# Patient Record
Sex: MALE | Race: WHITE | Employment: FULL TIME | URBAN - NONMETROPOLITAN AREA
[De-identification: names, ages, dates, MRNs, and addresses within clinical notes are randomized per-mention and may not be internally consistent; named-entity substitution may affect disease eponyms.]

---

## 2017-10-12 ENCOUNTER — HOSPITAL ENCOUNTER (EMERGENCY)
Age: 27
Discharge: HOME | End: 2017-10-12
Attending: NURSE PRACTITIONER
Payer: COMMERCIAL

## 2017-10-12 VITALS
OXYGEN SATURATION: 96 % | HEART RATE: 106 BPM | TEMPERATURE: 98.8 F | HEIGHT: 71 IN | BODY MASS INDEX: 37.8 KG/M2 | WEIGHT: 270 LBS | RESPIRATION RATE: 16 BRPM | SYSTOLIC BLOOD PRESSURE: 141 MMHG | DIASTOLIC BLOOD PRESSURE: 75 MMHG

## 2017-10-12 DIAGNOSIS — J01.20 ACUTE ETHMOIDAL SINUSITIS, RECURRENCE NOT SPECIFIED: Primary | ICD-10-CM

## 2017-10-12 PROCEDURE — 99212 OFFICE O/P EST SF 10 MIN: CPT

## 2017-10-12 PROCEDURE — 99213 OFFICE O/P EST LOW 20 MIN: CPT | Performed by: NURSE PRACTITIONER

## 2017-10-12 RX ORDER — AMOXICILLIN AND CLAVULANATE POTASSIUM 500; 125 MG/1; MG/1
1 TABLET, FILM COATED ORAL 3 TIMES DAILY
Qty: 30 TABLET | Refills: 0 | Status: SHIPPED | OUTPATIENT
Start: 2017-10-12 | End: 2017-10-22

## 2017-10-12 ASSESSMENT — ENCOUNTER SYMPTOMS
SORE THROAT: 1
DIARRHEA: 0
SINUS PRESSURE: 1
SINUS PAIN: 1
NAUSEA: 0
VOMITING: 0
COUGH: 1

## 2017-10-12 ASSESSMENT — PAIN SCALES - GENERAL: PAINLEVEL_OUTOF10: 8

## 2017-10-12 ASSESSMENT — PAIN DESCRIPTION - FREQUENCY: FREQUENCY: CONTINUOUS

## 2017-10-12 ASSESSMENT — PAIN DESCRIPTION - DURATION: DURATION_2: CONTINUOUS

## 2017-10-12 ASSESSMENT — PAIN DESCRIPTION - DESCRIPTORS
DESCRIPTORS_2: ACHING
DESCRIPTORS: ACHING

## 2017-10-12 ASSESSMENT — PAIN DESCRIPTION - ORIENTATION: ORIENTATION: RIGHT;LEFT

## 2017-10-12 ASSESSMENT — PAIN DESCRIPTION - LOCATION
LOCATION: EAR
LOCATION_2: THROAT

## 2017-10-12 ASSESSMENT — PAIN DESCRIPTION - PAIN TYPE
TYPE: ACUTE PAIN
TYPE_2: ACUTE PAIN

## 2017-10-12 ASSESSMENT — PAIN DESCRIPTION - PROGRESSION
CLINICAL_PROGRESSION_2: GRADUALLY WORSENING
CLINICAL_PROGRESSION: GRADUALLY WORSENING

## 2017-10-12 ASSESSMENT — PAIN DESCRIPTION - INTENSITY: RATING_2: 10

## 2017-10-12 NOTE — ED NOTES
PT GIVEN DISCHARGE INSTRUCTIONS, VERBALIZES UNDERSTANDING. PT ASSESSMENT UNCHANGED, DISCHARGED IN STABLE CONDITION.         Brandin Alcantara RN  10/12/17 4336

## 2017-10-12 NOTE — ED PROVIDER NOTES
atraumatic. Right Ear: External ear normal. A middle ear effusion is present. Left Ear: External ear normal. A middle ear effusion is present. Nose: Mucosal edema (thick green mucus) present. Right sinus exhibits no maxillary sinus tenderness and no frontal sinus tenderness. Left sinus exhibits no maxillary sinus tenderness and no frontal sinus tenderness. Mouth/Throat: Uvula is midline. Posterior oropharyngeal edema and posterior oropharyngeal erythema present. No oropharyngeal exudate. Eyes: Conjunctivae are normal.   Neck: Neck supple. Cardiovascular: Normal rate, regular rhythm and normal heart sounds. Exam reveals no gallop and no friction rub. No murmur heard. Pulmonary/Chest: Effort normal and breath sounds normal. No respiratory distress. He has no wheezes. Lymphadenopathy:     He has cervical adenopathy (with tenderness). Neurological: He is alert and oriented to person, place, and time. Skin: Skin is warm and dry. Psychiatric: He has a normal mood and affect. Nursing note and vitals reviewed. DIAGNOSTIC RESULTS   Labs:No results found for this visit on 10/12/17. IMAGING:    URGENT CARE COURSE:     Vitals:    10/12/17 0820   BP: (!) 141/75   Pulse: 106   Resp: 16   Temp: 98.8 °F (37.1 °C)   TempSrc: Oral   SpO2: 96%   Weight: 270 lb (122.5 kg)   Height: 5' 11\" (1.803 m)       Medications - No data to display  PROCEDURES:  None  FINAL IMPRESSION      1. Acute ethmoidal sinusitis, recurrence not specified        DISPOSITION/PLAN   DISPOSITION   Symptoms are consistent with acute ethmoid sinusitis. I explained to the patient he needs to continue his decongestant to stop all allergy medications due to the drying effect. I'm going to send him a prescription for Augmentin 500 mg 3 times a day for 10 days. He will be given an off work slip for [employer]. Follow up with primary care of symptoms aren't improving.   PATIENT REFERRED TO:  [facility]  [facility] will assist you in finding family doctor [phone]        DISCHARGE MEDICATIONS:  New Prescriptions    AMOXICILLIN-CLAVULANATE (AUGMENTIN) 500-125 MG PER TABLET    Take 1 tablet by mouth 3 times daily for 10 days     Current Discharge Medication List          Kenji Armendariz, [facility] [address], CNP  10/12/17 7272

## 2017-10-12 NOTE — ED TRIAGE NOTES
Pt ambulated to room with wife at side, toelrated well. Pt stated for 4 days he has had a sore throat, nasal congestion, and both ear aching. He didn't think he was having a fever at all. Pt stated he is taking Advil allergy and cold and mucinex and nothing is helping. He stated it is hard to swallow.

## 2019-09-29 ENCOUNTER — HOSPITAL ENCOUNTER (EMERGENCY)
Age: 29
Discharge: HOME | End: 2019-09-29
Attending: FAMILY MEDICINE
Payer: COMMERCIAL

## 2019-09-29 VITALS
SYSTOLIC BLOOD PRESSURE: 140 MMHG | TEMPERATURE: 98.7 F | OXYGEN SATURATION: 96 % | WEIGHT: 270 LBS | BODY MASS INDEX: 36.57 KG/M2 | RESPIRATION RATE: 18 BRPM | DIASTOLIC BLOOD PRESSURE: 91 MMHG | HEIGHT: 72 IN | HEART RATE: 86 BPM

## 2019-09-29 DIAGNOSIS — J02.0 STREP PHARYNGITIS: Primary | ICD-10-CM

## 2019-09-29 LAB
FLU A ANTIGEN: NEGATIVE
FLU B ANTIGEN: NEGATIVE
GROUP A STREP CULTURE, REFLEX: POSITIVE
REFLEX THROAT C + S: ABNORMAL

## 2019-09-29 PROCEDURE — 87804 INFLUENZA ASSAY W/OPTIC: CPT

## 2019-09-29 PROCEDURE — 6370000000 HC RX 637 (ALT 250 FOR IP): Performed by: FAMILY MEDICINE

## 2019-09-29 PROCEDURE — 99283 EMERGENCY DEPT VISIT LOW MDM: CPT

## 2019-09-29 PROCEDURE — 87880 STREP A ASSAY W/OPTIC: CPT

## 2019-09-29 RX ORDER — AMOXICILLIN 250 MG/1
500 CAPSULE ORAL ONCE
Status: COMPLETED | OUTPATIENT
Start: 2019-09-29 | End: 2019-09-29

## 2019-09-29 RX ORDER — AMOXICILLIN 500 MG/1
500 CAPSULE ORAL 3 TIMES DAILY
Qty: 30 CAPSULE | Refills: 0 | Status: SHIPPED | OUTPATIENT
Start: 2019-09-29 | End: 2019-10-09

## 2019-09-29 RX ADMIN — AMOXICILLIN 500 MG: 250 CAPSULE ORAL at 19:00

## 2019-09-29 ASSESSMENT — ENCOUNTER SYMPTOMS
NAUSEA: 0
SINUS PRESSURE: 0
EYE DISCHARGE: 0
SORE THROAT: 1
SINUS PAIN: 0
VOMITING: 0
EYE REDNESS: 0
COUGH: 1

## 2025-01-16 ENCOUNTER — HOSPITAL ENCOUNTER (EMERGENCY)
Age: 35
Discharge: HOME | End: 2025-01-17
Attending: FAMILY MEDICINE
Payer: COMMERCIAL

## 2025-01-16 DIAGNOSIS — K80.50 BILIARY COLIC: Primary | ICD-10-CM

## 2025-01-16 DIAGNOSIS — R10.11 ABDOMINAL PAIN, RIGHT UPPER QUADRANT: ICD-10-CM

## 2025-01-16 PROCEDURE — 99284 EMERGENCY DEPT VISIT MOD MDM: CPT

## 2025-01-16 ASSESSMENT — PAIN SCALES - GENERAL: PAINLEVEL_OUTOF10: 8

## 2025-01-16 ASSESSMENT — PAIN - FUNCTIONAL ASSESSMENT: PAIN_FUNCTIONAL_ASSESSMENT: 0-10

## 2025-01-17 VITALS
WEIGHT: 270 LBS | HEART RATE: 79 BPM | TEMPERATURE: 97.7 F | DIASTOLIC BLOOD PRESSURE: 79 MMHG | HEIGHT: 71 IN | RESPIRATION RATE: 19 BRPM | SYSTOLIC BLOOD PRESSURE: 125 MMHG | OXYGEN SATURATION: 100 % | BODY MASS INDEX: 37.8 KG/M2

## 2025-01-17 LAB
ALBUMIN SERPL BCP-MCNC: 4.1 GM/DL (ref 3.4–5)
ALP SERPL-CCNC: 67 U/L (ref 46–116)
ALT SERPL W P-5'-P-CCNC: 47 U/L (ref 14–63)
ANION GAP SERPL CALC-SCNC: 11 MEQ/L (ref 8–16)
AST SERPL W P-5'-P-CCNC: 23 U/L (ref 15–37)
BASOPHILS # BLD: 0.3 % (ref 0–3)
BASOPHILS ABSOLUTE: 0 THOU/MM3 (ref 0–0.1)
BILIRUB DIRECT SERPL-MCNC: 0.08 MG/DL (ref 0–0.2)
BILIRUB SERPL-MCNC: 0.2 MG/DL (ref 0.2–1)
BUN SERPL-MCNC: 18 MG/DL (ref 7–18)
CALCIUM SERPL-MCNC: 9.1 MG/DL (ref 8.5–10.1)
CHLORIDE SERPL-SCNC: 103 MEQ/L (ref 98–107)
CO2 SERPL-SCNC: 28 MEQ/L (ref 21–32)
CREAT SERPL-MCNC: 1 MG/DL (ref 0.6–1.3)
EOSINOPHILS ABSOLUTE: 0.1 THOU/MM3 (ref 0–0.5)
EOSINOPHILS RELATIVE PERCENT: 1.5 % (ref 0–4)
GFR SERPL CREATININE-BSD FRML MDRD: > 90 ML/MIN/1.73M2
GLUCOSE SERPL-MCNC: 136 MG/DL (ref 74–106)
HCT VFR BLD CALC: 40.6 % (ref 42–52)
HEMOGLOBIN: 13.4 GM/DL (ref 14–18)
IMMATURE GRANS (ABS): 0 THOU/MM3 (ref 0–0.07)
IMMATURE GRANULOCYTES %: 0 %
LIPASE SERPL-CCNC: 31 U/L (ref 16–77)
LYMPHOCYTES # BLD AUTO: 41.2 % (ref 15–47)
LYMPHOCYTES ABSOLUTE: 3.2 THOU/MM3 (ref 1–4.8)
MCH RBC QN AUTO: 29.4 PG (ref 26–32)
MCHC RBC AUTO-ENTMCNC: 33 GM/DL (ref 31–35)
MCV RBC AUTO: 89 FL (ref 80–94)
MONOCYTES: 0.8 THOU/MM3 (ref 0.3–1.3)
MONOCYTES: 9.8 % (ref 0–12)
NEUTROPHILS ABSOLUTE: 3.7 THOU/MM3 (ref 1.8–7.7)
PDW BLD-RTO: 12.6 % (ref 11.5–14.9)
PLATELET # BLD AUTO: 221 THOU/MM3 (ref 130–400)
PMV BLD AUTO: 10.5 FL (ref 9.4–12.4)
POTASSIUM SERPL-SCNC: 3.4 MEQ/L (ref 3.5–5.1)
PROT SERPL-MCNC: 7.5 GM/DL (ref 6.4–8.2)
RBC # BLD: 4.56 MILL/MM3 (ref 4.5–6.1)
SEG NEUTROPHILS: 47.1 % (ref 43–75)
SODIUM SERPL-SCNC: 142 MEQ/L (ref 136–145)
TROPONIN, HIGH SENSITIVITY: 6 PG/ML (ref 0–76.1)
WBC # BLD: 7.8 THOU/MM3 (ref 4.8–10.8)

## 2025-01-17 PROCEDURE — 2500000003 HC RX 250 WO HCPCS: Performed by: FAMILY MEDICINE

## 2025-01-17 PROCEDURE — 85025 COMPLETE CBC W/AUTO DIFF WBC: CPT

## 2025-01-17 PROCEDURE — 80048 BASIC METABOLIC PNL TOTAL CA: CPT

## 2025-01-17 PROCEDURE — 6360000002 HC RX W HCPCS: Performed by: FAMILY MEDICINE

## 2025-01-17 PROCEDURE — 80076 HEPATIC FUNCTION PANEL: CPT

## 2025-01-17 PROCEDURE — 96375 TX/PRO/DX INJ NEW DRUG ADDON: CPT

## 2025-01-17 PROCEDURE — 96374 THER/PROPH/DIAG INJ IV PUSH: CPT

## 2025-01-17 PROCEDURE — 83690 ASSAY OF LIPASE: CPT

## 2025-01-17 PROCEDURE — 2580000003 HC RX 258: Performed by: FAMILY MEDICINE

## 2025-01-17 PROCEDURE — 84484 ASSAY OF TROPONIN QUANT: CPT

## 2025-01-17 RX ORDER — KETOROLAC TROMETHAMINE 30 MG/ML
30 INJECTION, SOLUTION INTRAMUSCULAR; INTRAVENOUS ONCE
Status: COMPLETED | OUTPATIENT
Start: 2025-01-17 | End: 2025-01-17

## 2025-01-17 RX ADMIN — KETOROLAC TROMETHAMINE 30 MG: 30 INJECTION, SOLUTION INTRAMUSCULAR at 00:22

## 2025-01-17 RX ADMIN — FAMOTIDINE 20 MG: 10 INJECTION, SOLUTION INTRAVENOUS at 00:22

## 2025-01-17 NOTE — ED NOTES
Patient reported, \"abdominal pain wraps around the abdominal more on the right side to the back. Last ate two cheeseburgers at 1730. Abdominal pain started around 1900. I took pepto bismal around 1930 and it didn't help.\" Observed patient appears to be uncomfortable, placed in a gown. IV started, labs drawn and taken to the lab. Dr. Narayan informed of the patient.

## 2025-01-17 NOTE — DISCHARGE INSTR - COC
Continuity of Care Form    Patient Name: Pawel Marshall   :  1990  MRN:  [id number]    Admit date:  2025  Discharge date:  ***    Code Status Order: No Order   Advance Directives:   Advance Care Flowsheet Documentation             Admitting Physician:  No admitting provider for patient encounter.  PCP: No primary care provider on file.    Discharging Nurse: ***  Discharging Hospital Unit/Room#: E5/E5  Discharging Unit Phone Number: ***    Emergency Contact:   Extended Emergency Contact Information  Primary Emergency Contact: Fariba Marshall  Address: [address] [place] [id number] [facility]  Home Phone: [phone]  Relation: Spouse    Past Surgical History:  Past Surgical History:   Procedure Laterality Date    ANKLE SURGERY      left ankle        Immunization History:     There is no immunization history on file for this patient.    Active Problems:  There is no problem list on file for this patient.      Isolation/Infection:   Isolation            No Isolation          Patient Infection Status       None to display            Nurse Assessment:  Last Vital Signs: /79   Pulse 79   Temp 97.7 °F (36.5 °C) (Oral)   Resp 19   Ht 1.803 m (5' 11\")   Wt 122.5 kg (270 lb)   SpO2 100%   BMI 37.66 kg/m²     Last documented pain score (0-10 scale): Pain Level: 8  Last Weight:   Wt Readings from Last 1 Encounters:   25 122.5 kg (270 lb)     Mental Status:  {IP PT MENTAL STATUS:05513}    IV Access:  { HARRY IV ACCESS:141457321}    Nursing Mobility/ADLs:  Walking   {CHP DME ADLs:331433539}  Transfer  {CHP DME ADLs:699805601}  Bathing  {CHP DME ADLs:032479142}  Dressing  {CHP DME ADLs:390426693}  Toileting  {CHP DME ADLs:205595537}  Feeding  {CHP DME ADLs:510101714}  Med Admin  {CHP DME ADLs:433887725}  Med Delivery   { HARRY MED Delivery:007074902}    Wound Care Documentation and Therapy:        Elimination:  Continence:   Bowel: {YES / NO:13298}  Bladder: {YES

## 2025-01-17 NOTE — ED PROVIDER NOTES
Gallbladder transverse: Positive echogenic stones with acoustic shadows. GB wall 0.2 cm thick.      Common bile duct: Not dilated, 0.2 cm diameter.   Final impression:   Limited gallbladder US, positive for cholelithiasis, negative for cholecystitis or CBD dilatation.    Images and videos stored on PACS System     Vitals:    01/16/25 2355   BP: (!) 134/104   Pulse: 72   Resp: 19   Temp: 97.7 °F (36.5 °C)   SpO2: 98%       Differential diagnosis: Biliary colic, pancreatitis, non-specific abdominal pain, UTI    ED COURSE & MEDICAL DECISION MAKING   Pleasant 34M presented with RUQ pain, found to be due to gallstones, without cholecystitis. Pt was given IV toradol and pepcid, which seemed to have helped to relieve his symptoms. Via shared decision making, pt agreed with plan for discharge home, given dietary restrictions and recommendations.    FINAL IMPRESSION   1. Biliary colic    2. Abdominal pain, right upper quadrant      FINAL DISPOSITION:  Discharge home with anticipatory guidance, given strict return precautions.      Pertinent Labs & Imaging studies reviewed and interpreted. (See chart for details)   See EMR for medications prescribed    (This note was completed with a voice recognition program)       Owen Narayan MD  01/17/25 0102

## 2025-01-31 ENCOUNTER — PREP FOR PROCEDURE (OUTPATIENT)
Dept: SURGERY | Age: 35
End: 2025-01-31

## 2025-01-31 ENCOUNTER — OFFICE VISIT (OUTPATIENT)
Dept: SURGERY | Age: 35
End: 2025-01-31
Payer: COMMERCIAL

## 2025-01-31 ENCOUNTER — TELEPHONE (OUTPATIENT)
Dept: SURGERY | Age: 35
End: 2025-01-31

## 2025-01-31 VITALS
HEART RATE: 68 BPM | BODY MASS INDEX: 37.1 KG/M2 | SYSTOLIC BLOOD PRESSURE: 106 MMHG | OXYGEN SATURATION: 96 % | TEMPERATURE: 98.2 F | HEIGHT: 71 IN | WEIGHT: 265 LBS | DIASTOLIC BLOOD PRESSURE: 62 MMHG

## 2025-01-31 DIAGNOSIS — R10.13 EPIGASTRIC ABDOMINAL PAIN: Primary | ICD-10-CM

## 2025-01-31 DIAGNOSIS — K80.10 CHRONIC CHOLECYSTITIS WITH CALCULUS: ICD-10-CM

## 2025-01-31 PROCEDURE — 99204 OFFICE O/P NEW MOD 45 MIN: CPT | Performed by: SURGERY

## 2025-01-31 NOTE — PROGRESS NOTES
ARTEM Yang  [facility] GENERAL SURGERY  [address]  [place] [address]  [phone]  New Patient Evaluation in Office    Pt Name: Pawel Marshall  Date of Birth 1990   Today's Date: 1/31/2025  Medical Record Number: [id number]  Referring Provider: No ref. provider found  Primary Care Provider: No primary care provider on file.  Chief Complaint   Patient presents with    Surgical Consult     NP seen in ER 1/17/25 - Epigastric abdominal pain       ASSESSMENT       Diagnosis Orders   1. Epigastric abdominal pain  US GALLBLADDER RUQ    bedside US in ED suggesting cholelithiasis.      History reviewed. No pertinent past medical history.       PLANS   Assessment & Plan   Schedule Pawel for L/S cholecystectomy possible open  The risks, options and alternatives were discussed at length with the patient.  The risks including bleeding, infection, reoperation, bile duct injury and possible conversion to an open procedure were covered as well as nonresolution of pain. The possibility of other unforeseen complications including bile leak were also mentioned. Patient was made aware of intraoperative complications such as other organ injury or injury to surrounding structures. We also discussed the conduct of the operation, probable outpatient stay postoperatively and the typical post operative recovery and restrictions.  The patients questions were answered. After this discussion, the patient would like to proceed with cholecystectomy.  Status: outpatient  Planned anesthesia: general  He will undergo pre-operative clearance per anesthesia guidelines with risk factors listed under the past medical history diagnosis & problem list.     SUBJECTIVE      Pawel is a 34 y.o. male seen in the consultation for evaluation regarding gallbladder. Onset of symptoms was a few months with gradually worsened since that time. The symptoms have included right upper quadrant pain and pain

## 2025-02-06 ENCOUNTER — HOSPITAL ENCOUNTER (OUTPATIENT)
Dept: ULTRASOUND IMAGING | Age: 35
Discharge: HOME | End: 2025-02-06
Attending: SURGERY
Payer: COMMERCIAL

## 2025-02-06 DIAGNOSIS — R10.13 EPIGASTRIC ABDOMINAL PAIN: ICD-10-CM

## 2025-02-06 PROCEDURE — 76705 ECHO EXAM OF ABDOMEN: CPT

## 2025-02-07 RX ORDER — INDOCYANINE GREEN AND WATER 25 MG
2.5 KIT INJECTION
Status: CANCELLED | OUTPATIENT
Start: 2025-02-07 | End: 2025-02-07

## 2025-02-07 RX ORDER — SODIUM CHLORIDE 0.9 % (FLUSH) 0.9 %
5-40 SYRINGE (ML) INJECTION EVERY 12 HOURS SCHEDULED
Status: CANCELLED | OUTPATIENT
Start: 2025-02-07

## 2025-02-07 RX ORDER — SODIUM CHLORIDE 9 MG/ML
INJECTION, SOLUTION INTRAVENOUS PRN
Status: CANCELLED | OUTPATIENT
Start: 2025-02-07

## 2025-02-07 RX ORDER — SODIUM CHLORIDE 0.9 % (FLUSH) 0.9 %
5-40 SYRINGE (ML) INJECTION PRN
Status: CANCELLED | OUTPATIENT
Start: 2025-02-07

## 2025-02-09 NOTE — H&P
ARTEM Yang  [facility] GENERAL SURGERY  [address]  [place] [address]  [phone]  New Patient Evaluation in Office    Pt Name: Pawel Marshall  Date of Birth 1990   Today's Date: 1/31/2025  Medical Record Number: [id number]  Referring Provider: No ref. provider found  Primary Care Provider: No primary care provider on file.  Chief Complaint   Patient presents with    Surgical Consult     NP seen in ER 1/17/25 - Epigastric abdominal pain       ASSESSMENT       Diagnosis Orders   1. Epigastric abdominal pain  US GALLBLADDER RUQ    bedside US in ED suggesting cholelithiasis.      History reviewed. No pertinent past medical history.       PLANS   Assessment & Plan   Schedule Pawel for L/S cholecystectomy possible open  The risks, options and alternatives were discussed at length with the patient.  The risks including bleeding, infection, reoperation, bile duct injury and possible conversion to an open procedure were covered as well as nonresolution of pain. The possibility of other unforeseen complications including bile leak were also mentioned. Patient was made aware of intraoperative complications such as other organ injury or injury to surrounding structures. We also discussed the conduct of the operation, probable outpatient stay postoperatively and the typical post operative recovery and restrictions.  The patients questions were answered. After this discussion, the patient would like to proceed with cholecystectomy.  Status: outpatient  Planned anesthesia: general  He will undergo pre-operative clearance per anesthesia guidelines with risk factors listed under the past medical history diagnosis & problem list.     SUBJECTIVE      Pawel is a 34 y.o. male seen in the consultation for evaluation regarding gallbladder. Onset of symptoms was a few months with gradually worsened since that time. The symptoms have included right upper quadrant pain and pain

## 2025-02-10 ENCOUNTER — ANESTHESIA EVENT (OUTPATIENT)
Dept: OPERATING ROOM | Age: 35
End: 2025-02-10
Payer: COMMERCIAL

## 2025-02-10 ENCOUNTER — ANESTHESIA (OUTPATIENT)
Dept: OPERATING ROOM | Age: 35
End: 2025-02-10
Payer: COMMERCIAL

## 2025-02-10 ENCOUNTER — HOSPITAL ENCOUNTER (OUTPATIENT)
Age: 35
Setting detail: OUTPATIENT SURGERY
Discharge: HOME | End: 2025-02-10
Attending: SURGERY | Admitting: SURGERY
Payer: COMMERCIAL

## 2025-02-10 VITALS
TEMPERATURE: 97.4 F | OXYGEN SATURATION: 96 % | HEIGHT: 70 IN | RESPIRATION RATE: 16 BRPM | HEART RATE: 67 BPM | SYSTOLIC BLOOD PRESSURE: 124 MMHG | DIASTOLIC BLOOD PRESSURE: 61 MMHG | WEIGHT: 264 LBS | BODY MASS INDEX: 37.8 KG/M2

## 2025-02-10 DIAGNOSIS — G89.18 ACUTE POSTOPERATIVE PAIN: ICD-10-CM

## 2025-02-10 DIAGNOSIS — K80.10 CHRONIC CHOLECYSTITIS WITH CALCULUS: Primary | ICD-10-CM

## 2025-02-10 PROCEDURE — 3700000000 HC ANESTHESIA ATTENDED CARE: Performed by: SURGERY

## 2025-02-10 PROCEDURE — 47562 LAPAROSCOPIC CHOLECYSTECTOMY: CPT | Performed by: SURGERY

## 2025-02-10 PROCEDURE — 3700000001 HC ADD 15 MINUTES (ANESTHESIA): Performed by: SURGERY

## 2025-02-10 PROCEDURE — 6360000002 HC RX W HCPCS: Performed by: NURSE ANESTHETIST, CERTIFIED REGISTERED

## 2025-02-10 PROCEDURE — 2720000010 HC SURG SUPPLY STERILE: Performed by: SURGERY

## 2025-02-10 PROCEDURE — 2580000003 HC RX 258: Performed by: SURGERY

## 2025-02-10 PROCEDURE — 6370000000 HC RX 637 (ALT 250 FOR IP): Performed by: SURGERY

## 2025-02-10 PROCEDURE — 3600000013 HC SURGERY LEVEL 3 ADDTL 15MIN: Performed by: SURGERY

## 2025-02-10 PROCEDURE — 6360000002 HC RX W HCPCS: Performed by: ANESTHESIOLOGY

## 2025-02-10 PROCEDURE — 7100000011 HC PHASE II RECOVERY - ADDTL 15 MIN: Performed by: SURGERY

## 2025-02-10 PROCEDURE — 6360000002 HC RX W HCPCS: Performed by: SURGERY

## 2025-02-10 PROCEDURE — 2500000003 HC RX 250 WO HCPCS: Performed by: NURSE ANESTHETIST, CERTIFIED REGISTERED

## 2025-02-10 PROCEDURE — 7100000001 HC PACU RECOVERY - ADDTL 15 MIN: Performed by: SURGERY

## 2025-02-10 PROCEDURE — 2709999900 HC NON-CHARGEABLE SUPPLY: Performed by: SURGERY

## 2025-02-10 PROCEDURE — 88304 TISSUE EXAM BY PATHOLOGIST: CPT

## 2025-02-10 PROCEDURE — 3600000003 HC SURGERY LEVEL 3 BASE: Performed by: SURGERY

## 2025-02-10 PROCEDURE — 7100000010 HC PHASE II RECOVERY - FIRST 15 MIN: Performed by: SURGERY

## 2025-02-10 PROCEDURE — 7100000000 HC PACU RECOVERY - FIRST 15 MIN: Performed by: SURGERY

## 2025-02-10 RX ORDER — DEXAMETHASONE SODIUM PHOSPHATE 10 MG/ML
INJECTION, EMULSION INTRAMUSCULAR; INTRAVENOUS
Status: DISCONTINUED | OUTPATIENT
Start: 2025-02-10 | End: 2025-02-10 | Stop reason: SDUPTHER

## 2025-02-10 RX ORDER — ONDANSETRON 2 MG/ML
INJECTION INTRAMUSCULAR; INTRAVENOUS
Status: DISCONTINUED | OUTPATIENT
Start: 2025-02-10 | End: 2025-02-10 | Stop reason: SDUPTHER

## 2025-02-10 RX ORDER — LIDOCAINE HCL/PF 100 MG/5ML
SYRINGE (ML) INJECTION
Status: DISCONTINUED | OUTPATIENT
Start: 2025-02-10 | End: 2025-02-10 | Stop reason: SDUPTHER

## 2025-02-10 RX ORDER — HYDROCODONE BITARTRATE AND ACETAMINOPHEN 5; 325 MG/1; MG/1
1 TABLET ORAL EVERY 4 HOURS PRN
Qty: 30 TABLET | Refills: 0 | Status: SHIPPED | OUTPATIENT
Start: 2025-02-10 | End: 2025-02-15

## 2025-02-10 RX ORDER — NALOXONE HYDROCHLORIDE 0.4 MG/ML
INJECTION, SOLUTION INTRAMUSCULAR; INTRAVENOUS; SUBCUTANEOUS PRN
Status: DISCONTINUED | OUTPATIENT
Start: 2025-02-10 | End: 2025-02-10 | Stop reason: HOSPADM

## 2025-02-10 RX ORDER — FENTANYL CITRATE 50 UG/ML
50 INJECTION, SOLUTION INTRAMUSCULAR; INTRAVENOUS EVERY 5 MIN PRN
Status: DISCONTINUED | OUTPATIENT
Start: 2025-02-10 | End: 2025-02-10 | Stop reason: HOSPADM

## 2025-02-10 RX ORDER — PHENYLEPHRINE HCL IN 0.9% NACL 1 MG/10 ML
SYRINGE (ML) INTRAVENOUS
Status: DISCONTINUED | OUTPATIENT
Start: 2025-02-10 | End: 2025-02-10 | Stop reason: SDUPTHER

## 2025-02-10 RX ORDER — ROCURONIUM BROMIDE 10 MG/ML
INJECTION, SOLUTION INTRAVENOUS
Status: DISCONTINUED | OUTPATIENT
Start: 2025-02-10 | End: 2025-02-10 | Stop reason: SDUPTHER

## 2025-02-10 RX ORDER — HYDROMORPHONE HYDROCHLORIDE 2 MG/ML
INJECTION, SOLUTION INTRAMUSCULAR; INTRAVENOUS; SUBCUTANEOUS
Status: DISCONTINUED | OUTPATIENT
Start: 2025-02-10 | End: 2025-02-10 | Stop reason: SDUPTHER

## 2025-02-10 RX ORDER — SODIUM CHLORIDE 0.9 % (FLUSH) 0.9 %
5-40 SYRINGE (ML) INJECTION PRN
Status: DISCONTINUED | OUTPATIENT
Start: 2025-02-10 | End: 2025-02-10 | Stop reason: HOSPADM

## 2025-02-10 RX ORDER — KETOROLAC TROMETHAMINE 30 MG/ML
INJECTION, SOLUTION INTRAMUSCULAR; INTRAVENOUS
Status: DISCONTINUED | OUTPATIENT
Start: 2025-02-10 | End: 2025-02-10 | Stop reason: SDUPTHER

## 2025-02-10 RX ORDER — ONDANSETRON 2 MG/ML
4 INJECTION INTRAMUSCULAR; INTRAVENOUS EVERY 6 HOURS PRN
Status: DISCONTINUED | OUTPATIENT
Start: 2025-02-10 | End: 2025-02-10 | Stop reason: HOSPADM

## 2025-02-10 RX ORDER — SODIUM CHLORIDE 0.9 % (FLUSH) 0.9 %
5-40 SYRINGE (ML) INJECTION EVERY 12 HOURS SCHEDULED
Status: DISCONTINUED | OUTPATIENT
Start: 2025-02-10 | End: 2025-02-10 | Stop reason: HOSPADM

## 2025-02-10 RX ORDER — SODIUM CHLORIDE 9 MG/ML
INJECTION, SOLUTION INTRAVENOUS PRN
Status: DISCONTINUED | OUTPATIENT
Start: 2025-02-10 | End: 2025-02-10 | Stop reason: HOSPADM

## 2025-02-10 RX ORDER — FENTANYL CITRATE 50 UG/ML
INJECTION, SOLUTION INTRAMUSCULAR; INTRAVENOUS
Status: DISCONTINUED | OUTPATIENT
Start: 2025-02-10 | End: 2025-02-10 | Stop reason: SDUPTHER

## 2025-02-10 RX ORDER — HYDROCODONE BITARTRATE AND ACETAMINOPHEN 5; 325 MG/1; MG/1
1 TABLET ORAL EVERY 4 HOURS PRN
Status: DISCONTINUED | OUTPATIENT
Start: 2025-02-10 | End: 2025-02-10 | Stop reason: HOSPADM

## 2025-02-10 RX ORDER — INDOCYANINE GREEN AND WATER 25 MG
2.5 KIT INJECTION
Status: DISCONTINUED | OUTPATIENT
Start: 2025-02-10 | End: 2025-02-10 | Stop reason: HOSPADM

## 2025-02-10 RX ORDER — PROPOFOL 10 MG/ML
INJECTION, EMULSION INTRAVENOUS
Status: DISCONTINUED | OUTPATIENT
Start: 2025-02-10 | End: 2025-02-10 | Stop reason: SDUPTHER

## 2025-02-10 RX ORDER — BUPIVACAINE HYDROCHLORIDE 5 MG/ML
INJECTION, SOLUTION EPIDURAL; INTRACAUDAL PRN
Status: DISCONTINUED | OUTPATIENT
Start: 2025-02-10 | End: 2025-02-10 | Stop reason: ALTCHOICE

## 2025-02-10 RX ADMIN — PROPOFOL 200 MG: 10 INJECTION, EMULSION INTRAVENOUS at 09:27

## 2025-02-10 RX ADMIN — FENTANYL CITRATE 25 MCG: 50 INJECTION, SOLUTION INTRAMUSCULAR; INTRAVENOUS at 09:49

## 2025-02-10 RX ADMIN — Medication 100 MCG: at 09:44

## 2025-02-10 RX ADMIN — Medication 100 MCG: at 09:45

## 2025-02-10 RX ADMIN — SUGAMMADEX 200 MG: 100 INJECTION, SOLUTION INTRAVENOUS at 10:09

## 2025-02-10 RX ADMIN — HYDROMORPHONE HYDROCHLORIDE 0.5 MG: 2 INJECTION INTRAMUSCULAR; INTRAVENOUS; SUBCUTANEOUS at 09:51

## 2025-02-10 RX ADMIN — SODIUM CHLORIDE: 9 INJECTION, SOLUTION INTRAVENOUS at 09:06

## 2025-02-10 RX ADMIN — Medication 3000 MG: at 09:38

## 2025-02-10 RX ADMIN — KETOROLAC TROMETHAMINE 30 MG: 30 INJECTION, SOLUTION INTRAMUSCULAR at 10:00

## 2025-02-10 RX ADMIN — FENTANYL CITRATE 75 MCG: 50 INJECTION, SOLUTION INTRAMUSCULAR; INTRAVENOUS at 09:27

## 2025-02-10 RX ADMIN — Medication 100 MG: at 09:27

## 2025-02-10 RX ADMIN — HYDROMORPHONE HYDROCHLORIDE 1 MG: 2 INJECTION INTRAMUSCULAR; INTRAVENOUS; SUBCUTANEOUS at 10:16

## 2025-02-10 RX ADMIN — ONDANSETRON 4 MG: 2 INJECTION, SOLUTION INTRAMUSCULAR; INTRAVENOUS at 10:00

## 2025-02-10 RX ADMIN — ROCURONIUM BROMIDE 50 MG: 10 INJECTION, SOLUTION INTRAVENOUS at 09:27

## 2025-02-10 RX ADMIN — HYDROMORPHONE HYDROCHLORIDE 0.5 MG: 2 INJECTION INTRAMUSCULAR; INTRAVENOUS; SUBCUTANEOUS at 10:20

## 2025-02-10 RX ADMIN — HYDROCODONE BITARTRATE AND ACETAMINOPHEN 1 TABLET: 5; 325 TABLET ORAL at 11:23

## 2025-02-10 RX ADMIN — FENTANYL CITRATE 50 MCG: 50 INJECTION, SOLUTION INTRAMUSCULAR; INTRAVENOUS at 10:40

## 2025-02-10 RX ADMIN — DEXAMETHASONE SODIUM PHOSPHATE 10 MG: 10 INJECTION, EMULSION INTRAMUSCULAR; INTRAVENOUS at 09:27

## 2025-02-10 ASSESSMENT — PAIN SCALES - GENERAL
PAINLEVEL_OUTOF10: 5
PAINLEVEL_OUTOF10: 5
PAINLEVEL_OUTOF10: 6
PAINLEVEL_OUTOF10: 7
PAINLEVEL_OUTOF10: 6
PAINLEVEL_OUTOF10: 5
PAINLEVEL_OUTOF10: 7

## 2025-02-10 ASSESSMENT — PAIN DESCRIPTION - DESCRIPTORS
DESCRIPTORS: ACHING
DESCRIPTORS: ACHING
DESCRIPTORS: STABBING
DESCRIPTORS: ACHING
DESCRIPTORS: ACHING

## 2025-02-10 ASSESSMENT — PAIN SCALES - WONG BAKER
WONGBAKER_NUMERICALRESPONSE: HURTS A LITTLE BIT

## 2025-02-10 ASSESSMENT — PAIN DESCRIPTION - LOCATION
LOCATION: ABDOMEN

## 2025-02-10 ASSESSMENT — PAIN DESCRIPTION - ONSET
ONSET: ON-GOING

## 2025-02-10 ASSESSMENT — PAIN DESCRIPTION - PAIN TYPE
TYPE: SURGICAL PAIN

## 2025-02-10 ASSESSMENT — PAIN DESCRIPTION - FREQUENCY: FREQUENCY: CONTINUOUS

## 2025-02-10 ASSESSMENT — PAIN - FUNCTIONAL ASSESSMENT
PAIN_FUNCTIONAL_ASSESSMENT: 0-10
PAIN_FUNCTIONAL_ASSESSMENT: PREVENTS OR INTERFERES SOME ACTIVE ACTIVITIES AND ADLS
PAIN_FUNCTIONAL_ASSESSMENT: 0-10

## 2025-02-10 ASSESSMENT — PAIN DESCRIPTION - ORIENTATION: ORIENTATION: MID

## 2025-02-10 NOTE — ANESTHESIA POSTPROCEDURE EVALUATION
src:Temporal, Pulse:80, Resp:18, SpO2:93 %    Level of Consciousness:  Awake    Respiratory:  Stable    Oxygen Saturation:  Stable    Cardiovascular:  Stable    Hydration:  Adequate    PONV:  Stable    Post-op Pain:  Adequate analgesia    Post-op Assessment:  No apparent anesthetic complications    Additional Follow-Up / Treatment / Comment:  None    Peng Preciado DO  February 10, 2025   2:14 PM      No notable events documented.

## 2025-02-10 NOTE — PROGRESS NOTES
Patient oriented to Same Day department and admitted to Same Day Surgery room 20.   Patient verbalized approval for first name, last initial with physician name on unit whiteboard.     Plan of care reviewed with patient.   Patient room whiteboard filled out and discussed with patient and responsible adult.   Patient and responsible adult offered Same Day Welcome Packet to review.    Call light in reach.   Bed in lowest position, locked, with one bed rail up.   SCDs and warming blanket in place.  Appropriate arm bands on patient.   Bathroom offered.   All questions and concerns of patient addressed.        Meds to Beds:   Patient informed of [facility] Meds to Beds program during admission. Patient is agreeable to program.   Contact information for the pharmacy and the Meds to Beds program:   Name: Fariba   Relationship to patient:spouse/significant other   Phone number: [phone]

## 2025-02-10 NOTE — PROGRESS NOTES
1017 pt arrived to pacu, awakens to voice. Respirations unlabored on 4L NC. Sites CDI x4. VSS. Pt states pain 8/10 at this time  1020 medicated by CRNA  1025 pt resting, resp easy. VSS  1040 pt awake in bed, states pain 7/10. Medicated with 50 mcg fentanyl. VSS  1045 pt resting, easily awakens to voice and states pain 6/10 and tolerable. VSS  1050 pt resting with snoring respirations. VSS  1100 pt meets criteria for discharge from pacu at this time. Pt transported to \[facility]\"" in stable condition

## 2025-02-10 NOTE — PROGRESS NOTES
Pt [facility]. Vitals and assessment as charted. 0.9 infusing, to count from PACU. Pt has crackers and water. Family at the bedside. Pt and family verbalized understanding of discharge criteria and call light use. Call light in reach.

## 2025-02-10 NOTE — OP NOTE
Postoperative findings were discussed with the patient's family.  He was given discharge instructions, prescriptions for analgesics and antiemetics. He will follow up in my office in a 2-week period of time for reevaluation.      Electronically signed by Abebe Inman DO on 2/10/2025 at 10:13 AM

## 2025-02-10 NOTE — PROGRESS NOTES
Pt has met discharge criteria and states he is ready for discharge to home. IV removed, gauze and tape applied. Dressed in own clothes and personal belongings gathered. Discharge instructions (with opioid medication education information) given to pt and family; pt and family verbalized understanding of discharge instructions, prescriptions and follow up appointments. Pt transported to discharge lobby by [facility] staff.

## 2025-02-10 NOTE — ANESTHESIA PRE PROCEDURE
Department of Anesthesiology  Preprocedure Note       Name:  Pawel Marshall   Age:  34 y.o.  :  1990                                          MRN:  [id number]         Date:  2/10/2025      Surgeon: Surgeon(s):  Abebe Inman DO    Procedure: Procedure(s):  Laparoscopic cholecystectomy possible open    Medications prior to admission:   Prior to Admission medications    Not on File       Current medications:    Current Facility-Administered Medications   Medication Dose Route Frequency Provider Last Rate Last Admin   • sodium chloride flush 0.9 % injection 5-40 mL  5-40 mL IntraVENous 2 times per day Abebe Inman DO       • sodium chloride flush 0.9 % injection 5-40 mL  5-40 mL IntraVENous PRN Abebe Inman DO       • 0.9 % sodium chloride infusion   IntraVENous PRN Abebe Inman DO       • ceFAZolin (ANCEF) 3000 mg in sodium chloride 0.9% 100 mL IVPB  3,000 mg IntraVENous On Call to OR Abebe Inman DO       • indocyanine green (IC-GREEN) syringe 2.5 mg  2.5 mg IntraVENous On Call to OR Abebe Inman DO           Allergies:  No Known Allergies    Problem List:    Patient Active Problem List   Diagnosis Code   • Chronic cholecystitis with calculus K80.10       Past Medical History:  History reviewed. No pertinent past medical history.    Past Surgical History:        Procedure Laterality Date   • ANKLE SURGERY      left ankle        Social History:    Social History     Tobacco Use   • Smoking status: Never   • Smokeless tobacco: Current     Types: Chew   Substance Use Topics   • Alcohol use: Yes     Comment: occasionally                                Ready to quit: Not Answered  Counseling given: Not Answered      Vital Signs (Current):   Vitals:    02/10/25 0807   BP: 121/73   Pulse: 70   Resp: 16   Temp: 97.4 °F (36.3 °C)   TempSrc: Temporal   SpO2: 97%   Weight: 119.7 kg (264 lb)   Height: 1.778 m (5' 10\")                                              BP Readings from Last

## 2025-02-11 ENCOUNTER — TELEPHONE (OUTPATIENT)
Dept: SURGERY | Age: 35
End: 2025-02-11

## 2025-02-28 ENCOUNTER — OFFICE VISIT (OUTPATIENT)
Dept: SURGERY | Age: 35
End: 2025-02-28

## 2025-02-28 VITALS
WEIGHT: 265 LBS | OXYGEN SATURATION: 97 % | RESPIRATION RATE: 16 BRPM | SYSTOLIC BLOOD PRESSURE: 122 MMHG | BODY MASS INDEX: 37.94 KG/M2 | DIASTOLIC BLOOD PRESSURE: 76 MMHG | HEART RATE: 81 BPM | HEIGHT: 70 IN | TEMPERATURE: 97.8 F

## 2025-02-28 DIAGNOSIS — Z90.49 S/P LAPAROSCOPIC CHOLECYSTECTOMY: Primary | ICD-10-CM

## 2025-02-28 PROCEDURE — 99024 POSTOP FOLLOW-UP VISIT: CPT | Performed by: SURGERY

## 2025-02-28 NOTE — PROGRESS NOTES
[facility]  [facility] GENERAL SURGERY  [address]  [place] [address]  [phone]  Post Procedure Evaluation in Office    Pt Name: Pawel Marshall  Date of Birth 1990   Today's Date: 2/28/2025  Medical Record Number: [id number]  Referring Provider: No ref. provider found  Primary Care Provider: Yaima Acuña MD  Chief Complaint   Patient presents with    Post-Op Check     S/P Laparoscopic cholecystectomy 2/10/25     ASSESSMENT       Diagnosis Orders   1. S/P laparoscopic cholecystectomy      2/10/25      2/10/2025  Incisions are clean, dry and intact or healing as expected   PLANS   Assessment & Plan   Pathology reviewed with the patient who understands. All questions were answered.  Patient Instructions   May return to full activity without restrictions.   Follow up: Return for As needed. Instructed to call if any concerns.      SUBJECTIVE      Pawel is seen today for post-op follow-up. He is S/p L/S cholecystectomy 2/10/25. He is tolerating a regular diet, having regular bowel movements. Symptoms and activity have gradually improved compared to preoperative. The surgical site is clean and has no drainage. Pain is controlled without any narcotic pain medications. He has compliant with postoperative instructions.  Past Medical History  History reviewed. No pertinent past medical history.  Past Surgical History  Past Surgical History:   Procedure Laterality Date    ANKLE SURGERY      left ankle     CHOLECYSTECTOMY, LAPAROSCOPIC N/A 2/10/2025    Laparoscopic cholecystectomy possible open performed by Abebe Inman DO at [facility] OR     Medications  No current outpatient medications on file.     No current facility-administered medications for this visit.     Allergies  has No Known Allergies.  Social History   reports that he has never smoked. His smokeless tobacco use includes chew. He reports current alcohol use. He reports that he does not use

## (undated) DEVICE — TROCAR: Brand: KII FIOS FIRST ENTRY

## (undated) DEVICE — BAG SPEC REM 224ML W4XL6IN DIA10MM 1 HND GYN DISP ENDOPCH

## (undated) DEVICE — TROCAR: Brand: KII® SLEEVE

## (undated) DEVICE — GLOVE ORANGE PI 7 1/2   MSG9075

## (undated) DEVICE — UNIVERSAL PLUS MULTIFUNCTION INSTRUMENT SYSTEM (STRAIGHT GRIP HANDLE), STRAIGHT HANDLE ELECTROSURGICAL SUCTION/IRRIGATION INSTRUMENT WITH HAND CONTROLLED ELECTROSURGERY (BUTTONS): Brand: UNIVERSAL PLUS

## (undated) DEVICE — APPLIER CLP M L L11.4IN DIA10MM ENDOSCP ROT MULT FOR LIG

## (undated) DEVICE — UNIVERSAL PLUS LAPAROSCOPIC ELECTRODE WITH SUCTION/IRRIGATION, SPATULA 5 MM X 32 CM: Brand: UNIVERSAL PLUS

## (undated) DEVICE — TUBING INSUFFLATION SMK EVAC HI FLO SET PNEUMOCLEAR

## (undated) DEVICE — KIT IMAGING SYS RIGID W/SNGL USE KITX6 FLUORESCENT F/ENDOSCOPE PINPOINT DISP SPY-MIS PACK

## (undated) DEVICE — SUTURE MONOCRYL SZ 4-0 L27IN ABSRB UD L19MM PS-2 1/2 CIR PRIM Y426H

## (undated) DEVICE — GENERAL LAPAROSCOPY-LF: Brand: MEDLINE INDUSTRIES, INC.